# Patient Record
Sex: MALE | Race: BLACK OR AFRICAN AMERICAN | NOT HISPANIC OR LATINO | URBAN - METROPOLITAN AREA
[De-identification: names, ages, dates, MRNs, and addresses within clinical notes are randomized per-mention and may not be internally consistent; named-entity substitution may affect disease eponyms.]

---

## 2020-08-09 ENCOUNTER — HOSPITAL ENCOUNTER (EMERGENCY)
Facility: OTHER | Age: 30
Discharge: HOME | End: 2020-08-09
Attending: EMERGENCY MEDICINE
Payer: MEDICAID

## 2020-08-09 VITALS
HEART RATE: 62 BPM | WEIGHT: 175 LBS | BODY MASS INDEX: 24.5 KG/M2 | DIASTOLIC BLOOD PRESSURE: 89 MMHG | SYSTOLIC BLOOD PRESSURE: 145 MMHG | RESPIRATION RATE: 17 BRPM | HEIGHT: 71 IN | TEMPERATURE: 98 F | OXYGEN SATURATION: 100 %

## 2020-08-09 DIAGNOSIS — K02.9 PAIN DUE TO DENTAL CARIES: Primary | ICD-10-CM

## 2020-08-09 PROCEDURE — 99284 EMERGENCY DEPT VISIT MOD MDM: CPT

## 2020-08-09 PROCEDURE — 25000003 PHARM REV CODE 250: Performed by: PHYSICIAN ASSISTANT

## 2020-08-09 RX ORDER — TRAMADOL HYDROCHLORIDE 50 MG/1
50 TABLET ORAL EVERY 6 HOURS PRN
Qty: 12 TABLET | Refills: 0 | Status: SHIPPED | OUTPATIENT
Start: 2020-08-09

## 2020-08-09 RX ORDER — KETOROLAC TROMETHAMINE 10 MG/1
10 TABLET, FILM COATED ORAL
Status: COMPLETED | OUTPATIENT
Start: 2020-08-09 | End: 2020-08-09

## 2020-08-09 RX ORDER — TRAMADOL HYDROCHLORIDE 50 MG/1
50 TABLET ORAL
Status: COMPLETED | OUTPATIENT
Start: 2020-08-09 | End: 2020-08-09

## 2020-08-09 RX ORDER — KETOROLAC TROMETHAMINE 10 MG/1
10 TABLET, FILM COATED ORAL EVERY 6 HOURS
Qty: 12 TABLET | Refills: 0 | Status: SHIPPED | OUTPATIENT
Start: 2020-08-09 | End: 2020-08-12

## 2020-08-09 RX ORDER — PENICILLIN V POTASSIUM 500 MG/1
500 TABLET, FILM COATED ORAL 4 TIMES DAILY
Qty: 28 TABLET | Refills: 0 | Status: SHIPPED | OUTPATIENT
Start: 2020-08-09 | End: 2020-08-16

## 2020-08-09 RX ADMIN — KETOROLAC TROMETHAMINE 10 MG: 10 TABLET, FILM COATED ORAL at 11:08

## 2020-08-09 RX ADMIN — TRAMADOL HYDROCHLORIDE 50 MG: 50 TABLET, FILM COATED ORAL at 11:08

## 2020-08-09 NOTE — ED PROVIDER NOTES
Encounter Date: 8/9/2020       History     Chief Complaint   Patient presents with    Dental Pain     pt with c/o dental pain in several teeth .  pt unable to eat and sleep because of pain x 3 days     Juan Jose Almeida 30 y.o. male presented to the ED with c/o right lower dental pain.  Patient denies any recent trauma.  He states that he has known dental caries for some time.  He states that he has been unable to see dentist due to monetary reasons.  Reports for the past 3 days he has had increased pain to the right lower molar.  He states pain is keeping him up at night.  He states that he is unable to eat due to the pain.  He has tried over-the-counter are analgesics with no relief in symptoms.  He denies any fever, headache, facial swelling, difficulty swallowing or additional complaints at this time.      The history is provided by the patient.     Review of patient's allergies indicates:  No Known Allergies  History reviewed. No pertinent past medical history.  History reviewed. No pertinent surgical history.  History reviewed. No pertinent family history.  Social History     Tobacco Use    Smoking status: Former Smoker    Smokeless tobacco: Never Used    Tobacco comment: 1pack per week   Substance Use Topics    Alcohol use: Yes     Comment: several drinks per week    Drug use: Never     Review of Systems   Constitutional: Negative for activity change and fever.   HENT: Positive for dental problem. Negative for ear pain, facial swelling and sore throat.    Respiratory: Negative for shortness of breath.    Cardiovascular: Negative for chest pain.   Gastrointestinal: Negative for nausea and vomiting.   Musculoskeletal: Negative for neck pain and neck stiffness.   Skin: Negative for rash.   Neurological: Negative for weakness and headaches.   Hematological: Does not bruise/bleed easily.       Physical Exam     Initial Vitals [08/09/20 1028]   BP Pulse Resp Temp SpO2   (!) 147/86 (!) 59 18 98.3 °F (36.8 °C) 100 %       MAP       --         Physical Exam    Nursing note and vitals reviewed.  Constitutional: Vital signs are normal. He appears well-developed and well-nourished. He is cooperative.  Non-toxic appearance. He does not appear ill. He appears distressed (Mild distress secondary to pain).   HENT:   Head: Normocephalic and atraumatic.   Mouth/Throat: Oropharynx is clear and moist and mucous membranes are normal. No oral lesions. No trismus in the jaw. Dental caries present. No dental abscesses or uvula swelling.        TTP of the circled  Tooth with moderate dental decay noted with no fluctuance, drainage, bleeding, tonsillar, peritonsillar swelling, erythema, sublingual swelling or lymphadenopathy at this time. FROM of the TMJ.   Eyes: Conjunctivae and lids are normal.   Neck: Trachea normal and normal range of motion. Neck supple. No stridor present.   Cardiovascular: Normal rate.   Pulmonary/Chest: No respiratory distress.   Abdominal: Soft. Normal appearance.   Neurological: He is alert and oriented to person, place, and time. GCS eye subscore is 4. GCS verbal subscore is 5. GCS motor subscore is 6.   Skin: Skin is warm, dry and intact. No rash noted. No erythema.   Psychiatric: He has a normal mood and affect. His speech is normal and behavior is normal. Thought content normal.         ED Course   Procedures  Labs Reviewed - No data to display       Imaging Results    None         Juan Jose Elle 30 y.o. male presented to the ED with c/o right lower dental pain.  Patient denies any recent trauma.  He states that he has known dental caries for some time.  He states that he has been unable to see dentist due to monetary reasons.  Reports for the past 3 days he has had increased pain to the right lower molar.  He states pain is keeping him up at night.  He states that he is unable to eat due to the pain.  He has tried over-the-counter are analgesics with no relief in symptoms.  He denies any fever, headache, facial  swelling, difficulty swallowing or additional complaints at this time.  ROS positive for dental pain.  Physical exam reveals patient in some distress due to pain but otherwise well appearing. Face with no swelling or trismus. TTP of the circled  Tooth with moderate dental decay noted with no fluctuance, drainage, bleeding, tonsillar, peritonsillar swelling, erythema, sublingual swelling or lymphadenopathy at this time. FROM of the TMJ. FROM of neck and fair ROM of extremities. Lungs clear, heart regular rate and rhythm.    DDX: pain due to dental caries; pulpitis, dental abscess    ED management: patient remains well appearing, afebrile and with no signs of abscess at this time however, he intends to see dentist we will place on antibiotics, NSAIDs and short course of pain medication.  Encouraged patient to purchase 60 tooth to also help alleviate pain.  Patient states that he has blistery at home and he was encouraged to continue this.  He was counseled on smoking cessation.  I did review prescription monitor with no recent prescriptions      Impression/Plan: The encounter diagnosis was Pain due to dental caries. Discharged with veetid, Toradol and Ultram. Patient will follow up with Dentist.  Patient cautioned on when to return to ED.  Pt. Understands and agrees with current treatment plan                                      Clinical Impression:       ICD-10-CM ICD-9-CM   1. Pain due to dental caries  K02.9 521.00                                KARLI Simmons  08/09/20 3460

## 2020-08-09 NOTE — Clinical Note
Juan Jose Almeida was seen and treated in our emergency department on 8/9/2020.  He may return to work on 08/11/2020.       If you have any questions or concerns, please don't hesitate to call.      KARLI Simmons

## 2020-08-09 NOTE — ED TRIAGE NOTES
"Pt came in with complaints of toothache. Acute pain for "few days." 10/10. No abscess present. Unable to eat and sleep  "

## 2020-08-11 ENCOUNTER — HOSPITAL ENCOUNTER (EMERGENCY)
Facility: OTHER | Age: 30
Discharge: HOME | End: 2020-08-11
Attending: EMERGENCY MEDICINE
Payer: MEDICAID

## 2020-08-11 VITALS
HEIGHT: 71 IN | DIASTOLIC BLOOD PRESSURE: 94 MMHG | BODY MASS INDEX: 24.5 KG/M2 | OXYGEN SATURATION: 100 % | HEART RATE: 55 BPM | SYSTOLIC BLOOD PRESSURE: 151 MMHG | RESPIRATION RATE: 16 BRPM | TEMPERATURE: 99 F | WEIGHT: 175 LBS

## 2020-08-11 DIAGNOSIS — K04.7 APICAL ALVEOLAR ABSCESS: ICD-10-CM

## 2020-08-11 DIAGNOSIS — K02.9 DENTAL CAVITY: Primary | ICD-10-CM

## 2020-08-11 PROCEDURE — 25000003 PHARM REV CODE 250: Performed by: EMERGENCY MEDICINE

## 2020-08-11 PROCEDURE — 99284 EMERGENCY DEPT VISIT MOD MDM: CPT

## 2020-08-11 RX ORDER — CLINDAMYCIN HYDROCHLORIDE 150 MG/1
300 CAPSULE ORAL
Status: DISCONTINUED | OUTPATIENT
Start: 2020-08-11 | End: 2020-08-11

## 2020-08-11 RX ORDER — OXYCODONE AND ACETAMINOPHEN 5; 325 MG/1; MG/1
1 TABLET ORAL EVERY 6 HOURS PRN
Qty: 8 TABLET | Refills: 0 | Status: SHIPPED | OUTPATIENT
Start: 2020-08-11

## 2020-08-11 RX ORDER — CLINDAMYCIN HYDROCHLORIDE 300 MG/1
300 CAPSULE ORAL 3 TIMES DAILY
Qty: 21 CAPSULE | Refills: 0 | Status: SHIPPED | OUTPATIENT
Start: 2020-08-11 | End: 2020-08-18

## 2020-08-11 RX ORDER — IBUPROFEN 600 MG/1
600 TABLET ORAL EVERY 6 HOURS PRN
Qty: 9 TABLET | Refills: 0 | Status: SHIPPED | OUTPATIENT
Start: 2020-08-11

## 2020-08-11 RX ORDER — IBUPROFEN 400 MG/1
800 TABLET ORAL
Status: COMPLETED | OUTPATIENT
Start: 2020-08-11 | End: 2020-08-11

## 2020-08-11 RX ADMIN — IBUPROFEN 800 MG: 400 TABLET, FILM COATED ORAL at 05:08

## 2020-08-11 NOTE — ED NOTES
Pt with dental pain x several months now. Reporting increasing pain over the last several days. Pt with several cracked, decayed back RIGHT molar teeth noted. Pt AAOx4 and appropriate at this time. Respirations even and unlabored. No acute distress noted

## 2020-08-11 NOTE — ED PROVIDER NOTES
Encounter Date: 8/11/2020    SCRIBE #1 NOTE: I, Juan Robles, am scribing for, and in the presence of, Dr. Armstrong.       History     Chief Complaint   Patient presents with    Dental Pain     R lower molar pain, several crcked teeth, increasing pain over the last several days, denies fevers, chills.      Time seen by provider: 5:13 PM    This is a 30 y.o. male who presents with complaint of dental pain that began five days ago. The patient was seen here on 8/09/2020 for similar symptoms and was discharged with Toradol, Tramadol, and Penicillin with minimal relief of his symptoms. He states that the tramadol relieves his symptoms for a few hours, but then it comes back. He has been unable to find a dentist, who accepts his insurance and can see him sooner rather than later. He tried to call [facility], who told him there was a six month wait. He denies fever, chills, sore throat, chest pain, shortness of breath, cough, nausea, vomiting, and dysuria.     The history is provided by the patient.     Review of patient's allergies indicates:  No Known Allergies  History reviewed. No pertinent past medical history.  History reviewed. No pertinent surgical history.  History reviewed. No pertinent family history.  Social History     Tobacco Use    Smoking status: Former Smoker    Smokeless tobacco: Never Used    Tobacco comment: 1pack per week   Substance Use Topics    Alcohol use: Yes     Comment: several drinks per week    Drug use: Never     Review of Systems   Constitutional: Negative for chills and fever.   HENT: Negative for congestion and sore throat.         Positive for right lower molar pain.   Eyes: Negative for photophobia and redness.   Respiratory: Negative for cough and shortness of breath.    Cardiovascular: Negative for chest pain.   Gastrointestinal: Negative for abdominal pain, nausea and vomiting.   Genitourinary: Negative for dysuria.   Musculoskeletal: Negative for back pain.   Skin:  Negative for rash.   Neurological: Negative for weakness, light-headedness and headaches.   Psychiatric/Behavioral: Negative for confusion.       Physical Exam     Initial Vitals [08/11/20 1647]   BP Pulse Resp Temp SpO2   (!) 151/94 (!) 55 16 98.9 °F (37.2 °C) 100 %      MAP       --         Physical Exam    Nursing note and vitals reviewed.  Constitutional: He appears well-developed and well-nourished. He is not diaphoretic. No distress.   HENT:   Head: Normocephalic and atraumatic.   Mouth/Throat: Oropharynx is clear and moist.   Moist mucus membranes. TMs clear and intact bilaterally. Cracked right lower molar with no evidence of abscess.   Eyes: Conjunctivae and EOM are normal. Pupils are equal, round, and reactive to light.   Conjunctivae pink, clear, and intact.    Neck: Normal range of motion. Neck supple.   No cervical lymphadenopathy.    Cardiovascular: Normal rate, regular rhythm, S1 normal, S2 normal and normal heart sounds. Exam reveals no gallop and no friction rub.    No murmur heard.  Pulmonary/Chest: Breath sounds normal. No respiratory distress. He has no wheezes. He has no rhonchi. He has no rales.   Lungs clear to auscultation bilaterally.    Abdominal: Soft. Bowel sounds are normal. There is no abdominal tenderness. There is no rebound and no guarding.   No audible bruits.    Musculoskeletal: Normal range of motion. No tenderness or edema.      Comments: No lower extremity edema.    Lymphadenopathy:     He has no cervical adenopathy.   Neurological: He is alert and oriented to person, place, and time.   Skin: Skin is warm and dry. Capillary refill takes less than 2 seconds. No rash noted. No pallor.   Warm and dry. No skin tenting, rashes, or lesions.     Psychiatric: He has a normal mood and affect. His behavior is normal. Judgment and thought content normal.         ED Course   Procedures  Labs Reviewed - No data to display       Imaging Results    None          Medical Decision Making:    History:   Old Medical Records: I decided to obtain old medical records.            Scribe Attestation:   Scribe #1: I performed the above scribed service and the documentation accurately describes the services I performed. I attest to the accuracy of the note.    Attending Attestation:           Physician Attestation for Scribe:  Physician Attestation Statement for Scribe #1: I, Dr. Pimentel, reviewed documentation, as scribed by Juan Robles  in my presence, and it is both accurate and complete.         Attending ED Notes:   Emergent evaluation a 30-year-old male with complaint of dental pain.  Patient is afebrile, nontoxic-appearing stable vital signs except for elevation of blood pressure.  No trismus, stridor or drooling.  No Too angina.  No facial edema, cellulitis abscess formation.  No intraoral abscess formation visualized.  The patient is extensively counseled on his diagnosis and treatment, discharged good condition and directed follow-up with dentistry in the next 24-48 hours.                        Clinical Impression:     1. Dental cavity    2. Apical alveolar abscess              ED Disposition Condition    Discharge Good        ED Prescriptions     Medication Sig Dispense Start Date End Date Auth. Provider    oxyCODONE-acetaminophen (PERCOCET) 5-325 mg per tablet Take 1 tablet by mouth every 6 (six) hours as needed for Pain. 8 tablet 8/11/2020  Mike Pimentel MD    ibuprofen (ADVIL,MOTRIN) 600 MG tablet Take 1 tablet (600 mg total) by mouth every 6 (six) hours as needed for Pain. 9 tablet 8/11/2020  Mike Pimentel MD    clindamycin (CLEOCIN) 300 MG capsule Take 1 capsule (300 mg total) by mouth 3 (three) times daily. for 7 days 21 capsule 8/11/2020 8/18/2020 Mike Pimentel MD        Follow-up Information     Follow up With Specialties Details Why Contact [facility] Dental [facility], Oral and Maxillofacial Surgery, Oral Surgery In 2 days   [address] [facility] [address]  [phone]                                       Mike Pimentel MD  08/11/20 1928

## 2023-03-09 ENCOUNTER — HOSPITAL ENCOUNTER (EMERGENCY)
Facility: OTHER | Age: 33
Discharge: HOME | End: 2023-03-09
Attending: EMERGENCY MEDICINE
Payer: MEDICAID

## 2023-03-09 VITALS
SYSTOLIC BLOOD PRESSURE: 114 MMHG | WEIGHT: 180 LBS | OXYGEN SATURATION: 100 % | DIASTOLIC BLOOD PRESSURE: 72 MMHG | RESPIRATION RATE: 16 BRPM | HEART RATE: 56 BPM | HEIGHT: 71 IN | BODY MASS INDEX: 25.2 KG/M2 | TEMPERATURE: 98 F

## 2023-03-09 DIAGNOSIS — S61.210A LACERATION OF RIGHT INDEX FINGER WITHOUT FOREIGN BODY WITHOUT DAMAGE TO NAIL, INITIAL ENCOUNTER: Primary | ICD-10-CM

## 2023-03-09 PROCEDURE — 25000003 PHARM REV CODE 250

## 2023-03-09 PROCEDURE — 99284 EMERGENCY DEPT VISIT MOD MDM: CPT

## 2023-03-09 PROCEDURE — 90471 IMMUNIZATION ADMIN: CPT | Performed by: PHYSICIAN ASSISTANT

## 2023-03-09 PROCEDURE — 63600175 PHARM REV CODE 636 W HCPCS: Performed by: PHYSICIAN ASSISTANT

## 2023-03-09 PROCEDURE — 90715 TDAP VACCINE 7 YRS/> IM: CPT | Performed by: PHYSICIAN ASSISTANT

## 2023-03-09 RX ADMIN — TETANUS TOXOID, REDUCED DIPHTHERIA TOXOID AND ACELLULAR PERTUSSIS VACCINE, ADSORBED 0.5 ML: 5; 2.5; 8; 8; 2.5 SUSPENSION INTRAMUSCULAR at 02:03

## 2023-03-09 RX ADMIN — NEOMYCIN-BACITRACN ZN-POLYMYX 3.5 MG-400 UNIT-5,000 UNIT/GRAM TOP OINT: OINTMENT at 03:03

## 2023-03-09 NOTE — ED PROVIDER NOTES
"  Source of History:  Patient    Chief complaint:  Laceration (Pt reports laceration that occurred yesterday at 1000 while cutting weed eater string with knife. Has lac to right 2nd finger. Unable to visualize wound bed due to super glue application by pt yesterday. No current bleeding. Tetanus not up to date per pt. )      HPI:  Juan Jose Almeida is a 32 y.o. male with no significant PMHx presenting with single laceration to the right 2nd finger s/p cutting weed eater string with a knife yesterday morning. States he slipped and accidentally cut his finger with the "clean blade". He cleaned the wound with rubbing alcohol and applied super glue to the laceration. He presents to the ED today inquiring about possible sutures. He reports some pain to the finger but denies numbness or tingling. 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 Tetanus is not up to date. He denies use of blood thinners. Denies other complaints at this time.     This is the extent to the patients complaints today here in the emergency department.    ROS: As per HPI and below:  Constitutional: No fever.  No chills.  Eyes: No visual changes.   ENT: No sore throat. No ear pain.  Urinary: No abnormal urination.  MSK: Laceration to right 2nd finger.   Integument: No rashes or lesions.    Review of patient's allergies indicates:  No Known Allergies    PMH:  As per HPI and below:  History reviewed. No pertinent past medical history.  History reviewed. No pertinent surgical history.    Social History     Tobacco Use    Smoking status: Every Day     Types: Cigarettes    Smokeless tobacco: Never    Tobacco comments:     1pack per week   Substance Use Topics    Alcohol use: Yes     Comment: several drinks per week    Drug use: Never       Physical Exam:    /75 (BP Location: Left arm, Patient Position: Sitting)   Pulse (!) 56   Temp 97.9 °F (36.6 °C) (Oral)   Resp 18   Ht 5' 11" (1.803 m)   Wt 81.6 kg (180 lb)   SpO2 98%   BMI 25.10 kg/m² "   Nursing note and vital signs reviewed.  Constitutional: No acute distress.  Eyes: No conjunctival injection.  Extraocular muscles are intact.  ENT: Normal phonation.  Musculoskeletal: 2 cm superficial linear laceration to volar aspect of right 2nd finger overlying the PIP. 1 cm area of dried super glue. No active bleeding. Sensation intact to light touch. Full ROM of right 2nd finger. Normal capillary refill. Radial pulses 2+.   Skin: No rashes seen.  Good turgor.   Psych: Appropriate, conversant.        I decided to obtain the patient's medical records.    No results found for this or any previous visit.  Imaging Results    None         MDM:     Initial Impression:  32 y.o. male with no significant PMHx presents to the ED with 2 cm superficial, linear laceration to the volar aspect of right second finger overlying the PIP after cutting his finger with a blade yesterday morning. No active bleeding. Has 1 cm area of wound with dried superglue. He denies numbness or tingling. Will update Tetanus. Will clean wound thoroughly, apply triple antibiotic ointment and sterile dressing.     Differential Diagnosis includes, but is not limited to:  Open fracture, vascular injury, tendon/ligament injury, nerve injury, retained foreign body, superficial laceration, abrasion.    ED Management: Tetanus updated. Wound was cleaned thoroughly with betadine. Applied triple antibiotic ointment and sterile dressing to finger. Wound left open to drain since initial injury occurred over 12 hours ago. Explained to patient risk of infection with suturing a laceration that has been open for more than 12 hours. Educated patient on proper wound care and return precautions. I do not feel prophylactic oral antibiotics are necessary at this time. Patient verbalizes agreement with this plan of care                     Diagnostic Impression:    No diagnosis found.             Please pardon typos or dictation errors, as this note was transcribed  using M*Modal fluency direct dictation software.       Sergio Coles PA-C  03/09/23 3234       Sergio Coles PA-C  03/09/23 1175

## 2023-03-09 NOTE — FIRST PROVIDER EVALUATION
Emergency Department TeleTriage Encounter Note      CHIEF COMPLAINT    Chief Complaint   Patient presents with    Laceration     Pt reports laceration that occurred yesterday at 1000 while cutting weed eater string with knife. Has lac to right 2nd finger. Unable to visualize wound bed due to super glue application by pt yesterday. No current bleeding. Tetanus not up to date per pt.        VITAL SIGNS   Initial Vitals [03/09/23 1041]   BP Pulse Resp Temp SpO2   132/84 (!) 54 16 97.9 °F (36.6 °C) 100 %      MAP       --            ALLERGIES    Review of patient's allergies indicates:  No Known Allergies    PROVIDER TRIAGE NOTE  33 y/o male presents with laceration , accidental injury with blade while cutting string at 9 am yesterday morning      Will order tetanus immunization pending ED provider evaluation.      Initial orders will be placed and care will be transferred to an alternate provider when patient is roomed for a full evaluation. Any additional orders and the final disposition will be determined by that provider.         ORDERS  Labs Reviewed - No data to display    ED Orders (720h ago, onward)      None              Virtual Visit Note: The provider triage portion of this emergency department evaluation and documentation was performed via [employer], a HIPAA-compliant telemedicine application, in concert with a tele-presenter in the room. A face to face patient evaluation with one of my colleagues will occur once the patient is placed in an emergency department room.      DISCLAIMER: This note was prepared with [employer] voice recognition transcription software. Garbled syntax, mangled pronouns, and other bizarre constructions may be attributed to that software system.

## 2023-03-09 NOTE — ED TRIAGE NOTES
Pt states he cut his right index finger yesterday using a blade. Pt states he glued it, but it is still bleeding. Pt able to move extremity and finger without difficulty. Pt is alert and oriented, ambulatory, respirations are even unlabored. PT is in NAD